# Patient Record
Sex: MALE | Race: WHITE
[De-identification: names, ages, dates, MRNs, and addresses within clinical notes are randomized per-mention and may not be internally consistent; named-entity substitution may affect disease eponyms.]

---

## 2022-09-10 ENCOUNTER — HOSPITAL ENCOUNTER (EMERGENCY)
Dept: HOSPITAL 75 - ER | Age: 32
Discharge: HOME | End: 2022-09-10
Payer: COMMERCIAL

## 2022-09-10 VITALS — SYSTOLIC BLOOD PRESSURE: 102 MMHG | DIASTOLIC BLOOD PRESSURE: 75 MMHG

## 2022-09-10 VITALS — HEIGHT: 70.87 IN | WEIGHT: 231.49 LBS | BODY MASS INDEX: 32.41 KG/M2

## 2022-09-10 DIAGNOSIS — Z28.310: ICD-10-CM

## 2022-09-10 DIAGNOSIS — B34.9: Primary | ICD-10-CM

## 2022-09-10 DIAGNOSIS — D72.829: ICD-10-CM

## 2022-09-10 LAB
ALBUMIN SERPL-MCNC: 4.1 GM/DL (ref 3.2–4.5)
ALP SERPL-CCNC: 61 U/L (ref 40–136)
ALT SERPL-CCNC: 35 U/L (ref 0–55)
APTT PPP: YELLOW S
BACTERIA #/AREA URNS HPF: NEGATIVE /HPF
BARBITURATES UR QL: NEGATIVE
BASOPHILS # BLD AUTO: 0.1 10^3/UL (ref 0–0.1)
BASOPHILS NFR BLD AUTO: 1 % (ref 0–10)
BENZODIAZ UR QL SCN: NEGATIVE
BILIRUB SERPL-MCNC: 0.8 MG/DL (ref 0.1–1)
BILIRUB UR QL STRIP: NEGATIVE
BUN/CREAT SERPL: 13
CALCIUM SERPL-MCNC: 9.2 MG/DL (ref 8.5–10.1)
CHLORIDE SERPL-SCNC: 105 MMOL/L (ref 98–107)
CO2 SERPL-SCNC: 23 MMOL/L (ref 21–32)
COCAINE UR QL: NEGATIVE
CREAT SERPL-MCNC: 0.77 MG/DL (ref 0.6–1.3)
EOSINOPHIL # BLD AUTO: 0.2 10^3/UL (ref 0–0.3)
EOSINOPHIL NFR BLD AUTO: 1 % (ref 0–10)
EOSINOPHIL NFR BLD MANUAL: 2 %
FIBRINOGEN PPP-MCNC: CLEAR MG/DL
GFR SERPLBLD BASED ON 1.73 SQ M-ARVRAT: 124 ML/MIN
GLUCOSE SERPL-MCNC: 98 MG/DL (ref 70–105)
GLUCOSE UR STRIP-MCNC: NEGATIVE MG/DL
HCT VFR BLD CALC: 44 % (ref 40–54)
HGB BLD-MCNC: 15.3 G/DL (ref 13.3–17.7)
KETONES UR QL STRIP: NEGATIVE
LEUKOCYTE ESTERASE UR QL STRIP: NEGATIVE
LIPASE SERPL-CCNC: 25 U/L (ref 8–78)
LYMPHOCYTES # BLD AUTO: 2.9 10^3/UL (ref 1–4)
LYMPHOCYTES NFR BLD AUTO: 19 % (ref 12–44)
MANUAL DIFFERENTIAL PERFORMED BLD QL: YES
MCH RBC QN AUTO: 30 PG (ref 25–34)
MCHC RBC AUTO-ENTMCNC: 35 G/DL (ref 32–36)
MCV RBC AUTO: 85 FL (ref 80–99)
METHADONE UR QL SCN: NEGATIVE
MONOCYTES # BLD AUTO: 0.7 10^3/UL (ref 0–1)
MONOCYTES NFR BLD AUTO: 5 % (ref 0–12)
MONOCYTES NFR BLD: 6 %
NEUTROPHILS # BLD AUTO: 11.4 10^3/UL (ref 1.8–7.8)
NEUTROPHILS NFR BLD AUTO: 74 % (ref 42–75)
NEUTS BAND NFR BLD MANUAL: 76 %
NITRITE UR QL STRIP: NEGATIVE
OPIATES UR QL SCN: NEGATIVE
OXYCODONE UR QL: NEGATIVE
PH UR STRIP: 7 [PH] (ref 5–9)
PLATELET # BLD: 288 10^3/UL (ref 130–400)
PMV BLD AUTO: 10.6 FL (ref 9–12.2)
POTASSIUM SERPL-SCNC: 4.2 MMOL/L (ref 3.6–5)
PROPOXYPH UR QL: NEGATIVE
PROT SERPL-MCNC: 7.2 GM/DL (ref 6.4–8.2)
PROT UR QL STRIP: NEGATIVE
RBC #/AREA URNS HPF: (no result) /HPF
RBC MORPH BLD: NORMAL
SODIUM SERPL-SCNC: 137 MMOL/L (ref 135–145)
SP GR UR STRIP: 1.01 (ref 1.02–1.02)
TRICYCLICS UR QL SCN: NEGATIVE
VARIANT LYMPHS NFR BLD MANUAL: 14 %
VARIANT LYMPHS NFR BLD MANUAL: 2 %
WBC # BLD AUTO: 15.3 10^3/UL (ref 4.3–11)
WBC #/AREA URNS HPF: (no result) /HPF

## 2022-09-10 PROCEDURE — 80053 COMPREHEN METABOLIC PANEL: CPT

## 2022-09-10 PROCEDURE — 85027 COMPLETE CBC AUTOMATED: CPT

## 2022-09-10 PROCEDURE — 36415 COLL VENOUS BLD VENIPUNCTURE: CPT

## 2022-09-10 PROCEDURE — 83690 ASSAY OF LIPASE: CPT

## 2022-09-10 PROCEDURE — 81000 URINALYSIS NONAUTO W/SCOPE: CPT

## 2022-09-10 PROCEDURE — 80306 DRUG TEST PRSMV INSTRMNT: CPT

## 2022-09-10 PROCEDURE — 83036 HEMOGLOBIN GLYCOSYLATED A1C: CPT

## 2022-09-10 PROCEDURE — 85007 BL SMEAR W/DIFF WBC COUNT: CPT

## 2022-09-10 NOTE — ED GENERAL
General


Chief Complaint:  Dizziness/Syncope


Stated Complaint:  DIZZINESS/VOMITING/FEVER/LOW BACK PAIN


Source of Information:  Patient


Exam Limitations:  No Limitations


 (MADISON MOSHER)





History of Present Illness


Date Seen by Provider:  Sep 10, 2022


Time Seen by Provider:  11:07


Initial Comments


Patient reports that he has not felt well for the past few days. Has been having

dizziness, nausea, vomiting and diarrhea all day yesterday. Today he still feels

weak and overall not well. Reports that he has had very little urine output and 

it is dark in color. Does report that he is a diabetic but does not check his 

blood sugars or take medication for it. Denies recent sick contacts that he is 

aware of. Denies exposure to COVID. Has not been vaccinated against COVID.


Timing/Duration:  1-2 Days


Severity:  Moderate


Modifying Factors:  worse with Eating; improves with Rest


Associated Systoms:  No Chest Pain, No Cough, No Diaphoresis; Fever/Chills; No 

Headaches; Loss of Appetite, Malaise, Nausea/Vomiting; No Rash 

(MADISON MOSHER)





Allergies and Home Medications


Allergies


Coded Allergies:  


     No Known Drug Allergies (Unverified , 9/10/22)





Patient Home Medication List


Home Medication List Reviewed:  Yes


 (MADISON MOSHER)


Ondansetron (Ondansetron Odt) 4 Mg Tab.rapdis, 4 MG PO Q4H PRN for NAUSEA-1ST 

LINE


   Prescribed by: Madison Mosher on 9/10/22 1256





Review of Systems


Review of Systems


Constitutional:  chills; No diaphoresis; dizziness, fever, malaise, weakness 

(generalized)


EENTM:  no symptoms reported


Respiratory:  No cough, No phlegm, No short of breath, No wheezing


Cardiovascular:  No chest pain, No palpitations


Gastrointestinal:  abdominal pain; No constipation; diarrhea, loss of appetite, 

nausea, vomiting


Genitourinary:  decreased output; No dysuria, No frequency, No incontinence


Musculoskeletal:  No back pain, No muscle pain


Skin:  No pruritus, No rash


Psychiatric/Neurological:  Denies Headache, Denies Numbness, Denies Tingling 

(MADISON MOSHER)





All Other Systems Reviewed


Negative Unless Noted:  Yes


 (MADISON MOSHER)





Past Medical-Social-Family Hx


Patient Social History


Tobacco Use?:  No


Substance use?:  No


Alcohol Use?:  No


 (MADISON MOSHER)





Family Medical History


Reviewed and Corrections made


 (MADISON MOSHER)





Physical Exam


Vital Signs





Vital Signs - First Documented








 9/10/22





 11:08


 


Temp 36.7


 


Pulse 69


 


Resp 18


 


B/P (MAP) 119/92 (101)


 


Pulse Ox 98


 


O2 Delivery Room Air





 (JOE PENA MD)


Vital Signs


Capillary Refill :  


 (MADISON MOSHER)


Height, Weight, BMI


Height: '"


Weight: lbs. oz. kg;  BMI


Method:


General Appearance:  No Apparent Distress, WD/WN; No Mild Distress


Eyes:  Bilateral Eye Normal Inspection


HEENT:  No Moist Mucous Membranes


Respiratory:  Chest Non Tender, Lungs Clear, Normal Breath Sounds, No Accessory 

Muscle Use, No Respiratory Distress


Cardiovascular:  Regular Rate, Rhythm, No Edema


Gastrointestinal:  Normal Bowel Sounds, No Organomegaly, No Pulsatile Mass, Non 

Tender, Soft


Back:  Normal Inspection, No CVA Tenderness


Extremity:  Normal Capillary Refill, Normal Inspection, Normal Range of Motion, 

Non Tender


Neurologic/Psychiatric:  Alert, Oriented x3


Skin:  Normal Color, Warm/Dry (MADISON MOSHER)





Progress/Results/Core Measures


Suspected Sepsis


SIRS


Temperature: 


Pulse:  


Respiratory Rate: 


 


Laboratory Tests


9/10/22 11:20: White Blood Count 15.3H


Blood Pressure  / 


Mean: 


 





Laboratory Tests


9/10/22 11:20: 


Creatinine 0.77, Platelet Count 288, Total Bilirubin 0.8


 (MADISON MOSHER)





Results/Orders


Lab Results





Laboratory Tests








Test


 9/10/22


11:20 9/10/22


11:30 Range/Units


 


 


White Blood Count


 15.3 H


 


 4.3-11.0


10^3/uL


 


Red Blood Count


 5.13 


 


 4.30-5.52


10^6/uL


 


Hemoglobin 15.3   13.3-17.7  g/dL


 


Hematocrit 44   40-54  %


 


Mean Corpuscular Volume 85   80-99  fL


 


Mean Corpuscular Hemoglobin 30   25-34  pg


 


Mean Corpuscular Hemoglobin


Concent 35 


 


 32-36  g/dL





 


Red Cell Distribution Width 12.6   10.0-14.5  %


 


Platelet Count


 288 


 


 130-400


10^3/uL


 


Mean Platelet Volume 10.6   9.0-12.2  fL


 


Immature Granulocyte % (Auto) 0    %


 


Neutrophils (%) (Auto) 74   42-75  %


 


Lymphocytes (%) (Auto) 19   12-44  %


 


Monocytes (%) (Auto) 5   0-12  %


 


Eosinophils (%) (Auto) 1   0-10  %


 


Basophils (%) (Auto) 1   0-10  %


 


Neutrophils # (Auto)


 11.4 H


 


 1.8-7.8


10^3/uL


 


Lymphocytes # (Auto)


 2.9 


 


 1.0-4.0


10^3/uL


 


Monocytes # (Auto)


 0.7 


 


 0.0-1.0


10^3/uL


 


Eosinophils # (Auto)


 0.2 


 


 0.0-0.3


10^3/uL


 


Basophils # (Auto)


 0.1 


 


 0.0-0.1


10^3/uL


 


Immature Granulocyte # (Auto)


 0.1 


 


 0.0-0.1


10^3/uL


 


Neutrophils % (Manual) 76    %


 


Lymphocytes % (Manual) 14    %


 


Monocytes % (Manual) 6    %


 


Eosinophils % (Manual) 2    %


 


Reactive Lymphocytes 2    %


 


Blood Morphology Comment NORMAL    


 


Sodium Level 137   135-145  MMOL/L


 


Potassium Level 4.2   3.6-5.0  MMOL/L


 


Chloride Level 105     MMOL/L


 


Carbon Dioxide Level 23   21-32  MMOL/L


 


Anion Gap 9   5-14  MMOL/L


 


Blood Urea Nitrogen 10   7-18  MG/DL


 


Creatinine


 0.77 


 


 0.60-1.30


MG/DL


 


Estimat Glomerular Filtration


Rate 124 


 


  





 


BUN/Creatinine Ratio 13    


 


Glucose Level 98     MG/DL


 


Calcium Level 9.2   8.5-10.1  MG/DL


 


Corrected Calcium 9.1   8.5-10.1  MG/DL


 


Total Bilirubin 0.8   0.1-1.0  MG/DL


 


Aspartate Amino Transf


(AST/SGOT) 26 


 


 5-34  U/L





 


Alanine Aminotransferase


(ALT/SGPT) 35 


 


 0-55  U/L





 


Alkaline Phosphatase 61     U/L


 


Total Protein 7.2   6.4-8.2  GM/DL


 


Albumin 4.1   3.2-4.5  GM/DL


 


Lipase 25   8-78  U/L


 


Urine Color  YELLOW   


 


Urine Clarity  CLEAR   


 


Urine pH  7.0  5-9  


 


Urine Specific Gravity  1.015 L 1.016-1.022  


 


Urine Protein  NEGATIVE  NEGATIVE  


 


Urine Glucose (UA)  NEGATIVE  NEGATIVE  


 


Urine Ketones  NEGATIVE  NEGATIVE  


 


Urine Nitrite  NEGATIVE  NEGATIVE  


 


Urine Bilirubin  NEGATIVE  NEGATIVE  


 


Urine Urobilinogen  1.0  < = 1.0  MG/DL


 


Urine Leukocyte Esterase  NEGATIVE  NEGATIVE  


 


Urine RBC (Auto)  NEGATIVE  NEGATIVE  


 


Urine RBC  NONE   /HPF


 


Urine WBC  NONE   /HPF


 


Urine Crystals  NONE   /LPF


 


Urine Bacteria  NEGATIVE   /HPF


 


Urine Casts  NONE   /LPF


 


Urine Mucus  NEGATIVE   /LPF


 


Urine Culture Indicated  NO   


 


Urine Opiates Screen  NEGATIVE  NEGATIVE  


 


Urine Oxycodone Screen  NEGATIVE  NEGATIVE  


 


Urine Methadone Screen  NEGATIVE  NEGATIVE  


 


Urine Propoxyphene Screen  NEGATIVE  NEGATIVE  


 


Urine Barbiturates Screen  NEGATIVE  NEGATIVE  


 


Ur Tricyclic Antidepressants


Screen 


 NEGATIVE 


 NEGATIVE  





 


Urine Phencyclidine Screen  NEGATIVE  NEGATIVE  


 


Urine Amphetamines Screen  POSITIVE H NEGATIVE  


 


Urine Methamphetamines Screen  POSITIVE H NEGATIVE  


 


Urine Benzodiazepines Screen  NEGATIVE  NEGATIVE  


 


Urine Cocaine Screen  NEGATIVE  NEGATIVE  


 


Urine Cannabinoids Screen  POSITIVE H NEGATIVE  





 (JOE PENA MD)


Medications Given in ED





Current Medications








 Medications  Dose


 Ordered  Sig/[place]


 Route  Start Time


 Stop Time Status Last Admin


Dose Admin


 


 Ketorolac


 Tromethamine  30 mg  ONCE  ONCE


 IVP  9/10/22 11:30


 9/10/22 11:31 DC 9/10/22 11:32


30 MG


 


 Ondansetron HCl  4 mg  ONCE  ONCE


 IVP  9/10/22 11:30


 9/10/22 11:31 DC 9/10/22 11:32


4 MG





 (JOE PENA MD)


Vital Signs/I&O











 9/10/22 9/10/22 9/10/22





 11:08 11:32 13:12


 


Temp 36.7 36.7 36.7


 


Pulse 69  70


 


Resp 18  18


 


B/P (MAP) 119/92 (101)  102/75


 


Pulse Ox 98  98


 


O2 Delivery Room Air  Room Air





 (JOE PENA MD)


Vital Signs/I&O


Capillary Refill :  


 (MADISON MOSHER)


Progress Note :  


Progress Note


Patient comes to the emergency department for nausea, vomiting, diarrhea and 

abdominal pain. Has had decreased urine output. Will go ahead and check labs and

give fluids and then reassess.





1250: WBC is elevated at 15.3. Likely secondary to the vomiting that he has been

having. Labs otherwise look okay. UA was negative for infection. Drug screen was

positive for amphetamines, methamphetamines and THC. Patient denies meth use. 

States that he does take Adderall but has been out of the medication for the 

past week. Continued to deny illegal drug use other then THC. Does report that 

he is feeling a lot better after medications. Reasons to return to the ER were 

discussed with patient.





 (MADISON MOSHER)





Departure


Impression





   Primary Impression:  


   Viral syndrome


Disposition:  01 HOME, SELF-CARE


Condition:  Stable





Departure-Patient Inst.


Decision time for Depature:  12:54


 (MADISON MOSHRE)


Referrals:  


NO,LOCAL PHYSICIAN (PCP/Family)


Primary Care Physician


Patient Instructions:  Viral Syndrome (DC)





Add. Discharge Instructions:  


1. Home and rest.


2. Push fluids.


3. Alternate Tylenol/Ibuprofen as needed for pain.


4. Follow up with PCP as needed.


5. [place] diet and advance as tolerated.


6. Return here if worse or concerns.


7. Zofran as needed for nausea.





All discharge instructions reviewed with patient and/or family. Voiced 

understanding.


Scripts


Ondansetron (Ondansetron Odt) 4 Mg Tab.rapdis


4 MG PO Q4H PRN for NAUSEA-1ST LINE, #20 TAB


   Prov: MADISON MOSHER         9/10/22








ATTENDING NOTE:





I was attending physician physically present and available for consultation in 

the emergency department during the care of this patient.  I was not directly 

involved in the delivery of care or decision making process for this patient 

during this specific encounter. 


 (JOE PENA MD)











MADISON MOSHER       Sep 10, 2022 11:14


JOE PENA MD        Sep 10, 2022 18:50